# Patient Record
Sex: FEMALE | Race: WHITE | NOT HISPANIC OR LATINO | Employment: FULL TIME | URBAN - METROPOLITAN AREA
[De-identification: names, ages, dates, MRNs, and addresses within clinical notes are randomized per-mention and may not be internally consistent; named-entity substitution may affect disease eponyms.]

---

## 2020-11-08 ENCOUNTER — HOSPITAL ENCOUNTER (EMERGENCY)
Facility: CLINIC | Age: 50
Discharge: HOME | End: 2020-11-08
Attending: EMERGENCY MEDICINE | Admitting: EMERGENCY MEDICINE
Payer: COMMERCIAL

## 2020-11-08 VITALS
DIASTOLIC BLOOD PRESSURE: 88 MMHG | SYSTOLIC BLOOD PRESSURE: 117 MMHG | RESPIRATION RATE: 178 BRPM | WEIGHT: 122 LBS | TEMPERATURE: 96.6 F | HEART RATE: 77 BPM | OXYGEN SATURATION: 97 %

## 2020-11-08 DIAGNOSIS — Z77.098 CHEMICAL EXPOSURE OF EYE: ICD-10-CM

## 2020-11-08 PROCEDURE — 99284 EMERGENCY DEPT VISIT MOD MDM: CPT | Performed by: EMERGENCY MEDICINE

## 2020-11-08 PROCEDURE — 99283 EMERGENCY DEPT VISIT LOW MDM: CPT | Performed by: EMERGENCY MEDICINE

## 2020-11-08 PROCEDURE — 250N000013 HC RX MED GY IP 250 OP 250 PS 637: Performed by: EMERGENCY MEDICINE

## 2020-11-08 PROCEDURE — 250N000009 HC RX 250: Performed by: EMERGENCY MEDICINE

## 2020-11-08 RX ORDER — ERYTHROMYCIN 5 MG/G
OINTMENT OPHTHALMIC ONCE
Status: DISCONTINUED | OUTPATIENT
Start: 2020-11-08 | End: 2020-11-08

## 2020-11-08 RX ORDER — OXYCODONE AND ACETAMINOPHEN 5; 325 MG/1; MG/1
1-2 TABLET ORAL EVERY 4 HOURS PRN
Qty: 6 TABLET | Refills: 0 | Status: SHIPPED | OUTPATIENT
Start: 2020-11-08

## 2020-11-08 RX ORDER — PROPARACAINE HYDROCHLORIDE 5 MG/ML
1 SOLUTION/ DROPS OPHTHALMIC ONCE
Status: DISCONTINUED | OUTPATIENT
Start: 2020-11-08 | End: 2020-11-08

## 2020-11-08 RX ORDER — TETRACAINE HYDROCHLORIDE 5 MG/ML
1-2 SOLUTION OPHTHALMIC ONCE
Status: COMPLETED | OUTPATIENT
Start: 2020-11-08 | End: 2020-11-08

## 2020-11-08 RX ORDER — ERYTHROMYCIN 5 MG/G
OINTMENT OPHTHALMIC ONCE
Status: COMPLETED | OUTPATIENT
Start: 2020-11-08 | End: 2020-11-08

## 2020-11-08 RX ORDER — OXYCODONE AND ACETAMINOPHEN 5; 325 MG/1; MG/1
1 TABLET ORAL ONCE
Status: COMPLETED | OUTPATIENT
Start: 2020-11-08 | End: 2020-11-08

## 2020-11-08 RX ADMIN — ERYTHROMYCIN: 5 OINTMENT OPHTHALMIC at 16:32

## 2020-11-08 RX ADMIN — OXYCODONE HYDROCHLORIDE AND ACETAMINOPHEN 1 TABLET: 5; 325 TABLET ORAL at 16:32

## 2020-11-08 RX ADMIN — TETRACAINE HYDROCHLORIDE 2 DROP: 5 SOLUTION OPHTHALMIC at 13:41

## 2020-11-08 NOTE — ED AVS SNAPSHOT
[facility] Emergency Dept  [address] [address]  Phone: [phone]  Fax: [phone]                                    Violet Schneider   MRN: [id number]    Department: [facility] Emergency Dept   Date of Visit: 11/8/2020           After Visit Summary Signature Page    I have received my discharge instructions, and my questions have been answered. I have discussed any challenges I see with this plan with the nurse or doctor.    ..........................................................................................................................................  Patient/Patient Representative Signature      ..........................................................................................................................................  Patient Representative Print Name and Relationship to Patient    ..................................................               ................................................  Date                                   Time    ..........................................................................................................................................  Reviewed by Signature/Title    ...................................................              ..............................................  Date                                               Time          22EPIC Rev 08/18

## 2020-11-08 NOTE — LETTER
November 8, 2020          To Whom It May Concern:          Violet Schneider was seen in our Emergency Department today, 11/08/20 for non-covid related issues.  I expect her condition to improve over the next 5 days.  She may return to work/school when improved.          Sincerely,        GRICELDA Hester RN

## 2020-11-08 NOTE — ED TRIAGE NOTES
Spoke with Lisa at poison control she stated Bioderma micellaire is only an irrinatant, no acid/base in  Solution. Just recommend irrigation and then checking for abrasion

## 2020-11-08 NOTE — ED PROVIDER NOTES
History     Chief Complaint   Patient presents with     Eye Pain     bilateral eye kendy after putting in contacts today, poss put wrong solution in contacts     HPI  Violet Schneider is a 50 year old female with past medical history significant for asthma who presents the emergency department planing of bilateral eye pain.  Patient around 1030 this morning was putting in her contacts and thinks she accidentally put her contacts in bioderma micellaire instead of her solution.  She immediately had severe pain in bilateral eyes and has had significant irritation since that time.  Her eyes are watering and she is having a bit of cloudy vision.  She denies any other illness or problems.  She denies any other significant problems.  She denies a headache she has not had any neck pain.  She is not short of breath.  She does not have a rash.  What is wrong can I help you    Allergies:  Allergies   Allergen Reactions     Morphine Sulfate        Problem List:    There are no active problems to display for this patient.       Past Medical History:    No past medical history on file.    Past Surgical History:    No past surgical history on file.    Family History:    No family history on file.    Social History:  Marital Status:   [2]  Social History     Tobacco Use     Smoking status: Never Smoker     Smokeless tobacco: Never Used   Substance Use Topics     Alcohol use: Yes     Comment: rare     Drug use: Not on file        Medications:         Montelukast Sodium (SINGULAIR PO)       ORDER FOR DME       traMADol (ULTRAM) 50 MG tablet          Review of Systems  As per HPI.  Physical Exam   BP: 117/88  Pulse: 77  Temp: 96.6  F (35.9  C)  Resp: (!) 178  Weight: 55.3 kg (122 lb)  SpO2: 97 %      Physical Exam  Vitals signs and nursing note reviewed.   Constitutional:       Appearance: Normal appearance. She is not ill-appearing, toxic-appearing or diaphoretic.      Comments: In distress secondary to eye pain   HENT:       Head: Normocephalic.      Nose: Nose normal.   Eyes:      General: Lids are normal. Lids are everted, no foreign bodies appreciated. Vision grossly intact. Gaze aligned appropriately.         Right eye: No foreign body or discharge.         Left eye: No foreign body or discharge.      Extraocular Movements: Extraocular movements intact.      Conjunctiva/sclera:      Right eye: Right conjunctiva is not injected.      Left eye: Left conjunctiva is not injected.      Pupils: Pupils are equal, round, and reactive to light.      Funduscopic exam:     Right eye: Red reflex present.         Left eye: Red reflex present.     Slit lamp exam:     Right eye: Anterior chamber quiet. No foreign body.      Left eye: Anterior chamber quiet. No foreign body.      Comments: Conjunctiva injected bilaterally.  There is mild superficial abrasions/punctate lesions on bilateral corneas.  No significant clouding uptake with fluorescein present.  No foreign bodies are noted    Neck:      Musculoskeletal: Normal range of motion.   Pulmonary:      Effort: Pulmonary effort is normal.   Musculoskeletal: Normal range of motion.      Right lower leg: No edema.   Skin:     General: Skin is warm and dry.   Neurological:      Mental Status: She is alert and oriented to person, place, and time.   Psychiatric:         Mood and Affect: Mood normal.         ED Course        Procedures               Critical Care time:  none               No results found for this or any previous visit (from the past 24 hour(s)).    Medications   tetracaine (PONTOCAINE) 0.5 % ophthalmic solution 1-2 drop (2 drops Both Eyes Given by Other 11/8/20 2193)       Assessments & Plan (with Medical Decision Making) records were reviewed Poison control was called and it was noted that bioderma Micellaire was a neutral solution without acid or base in it and that it is an irrigant and recommended irrigation and then checking for abrasion.  Proparacaine drops relieved patient's  pain.  There is evidence of corneal abrasions/irritation on slit-lamp exam.  No foreign body is noted.  Eyes were irrigated twice with normal saline pH remained 7.0 on recheck patient continued to have pain and was given a dose of Percocet.  Erythromycin ointment was placed bilaterally.  Patient was able to read fine print without difficulty after proparacaine drops.  Am going to have patient continue erythromycin ointment.  I have [facility] for [facility] assessment.  Patient is to return if pain worsens any vision changes or other changes occur.  She feels comfortable with this plan at this time.     I have reviewed the nursing notes.    I have reviewed the findings, diagnosis, plan and need for follow up with the patient.       Discharge Medication List as of 11/8/2020  5:27 PM      START taking these medications    Details   oxyCODONE-acetaminophen (PERCOCET) 5-325 MG tablet Take 1-2 tablets by mouth every 4 hours as needed for severe pain, Disp-6 tablet, R-0, Local Print             Final diagnoses:   Chemical exposure of eye       11/8/2020   [facility] EMERGENCY DEPT     Jarrett Ryan MD  11/10/20 4828

## 2020-11-08 NOTE — DISCHARGE INSTRUCTIONS
Return if increasing pain vision changes or other symptoms.  The chemical you and in your eyes was an irritant but did not have acid or basic qualities in it.  Continue to use the erythromycin ointment 4 times a day.  Take Percocet for pain and follow-up with ophthalmology tomorrow.  If severe pain or no improvement of symptoms please return to the ED for recheck if unable to see an eye physician.

## 2020-11-10 ASSESSMENT — VISUAL ACUITY: OU: 1

## 2023-09-09 ENCOUNTER — OFFICE VISIT (OUTPATIENT)
Dept: URGENT CARE | Facility: URGENT CARE | Age: 53
End: 2023-09-09
Payer: COMMERCIAL

## 2023-09-09 VITALS
DIASTOLIC BLOOD PRESSURE: 76 MMHG | SYSTOLIC BLOOD PRESSURE: 127 MMHG | WEIGHT: 126 LBS | OXYGEN SATURATION: 97 % | TEMPERATURE: 97.2 F | HEART RATE: 63 BPM

## 2023-09-09 DIAGNOSIS — J30.9 ALLERGIC SINUSITIS: Primary | ICD-10-CM

## 2023-09-09 PROBLEM — E78.2 MIXED HYPERLIPIDEMIA: Status: ACTIVE | Noted: 2022-01-17

## 2023-09-09 PROBLEM — K58.0 IRRITABLE BOWEL SYNDROME WITH DIARRHEA: Status: ACTIVE | Noted: 2023-02-22

## 2023-09-09 PROBLEM — I10 ESSENTIAL HYPERTENSION: Status: ACTIVE | Noted: 2022-01-17

## 2023-09-09 PROCEDURE — 99203 OFFICE O/P NEW LOW 30 MIN: CPT

## 2023-09-09 RX ORDER — MONTELUKAST SODIUM 10 MG/1
TABLET ORAL
COMMUNITY
Start: 2023-08-14

## 2023-09-09 RX ORDER — PREDNISONE 20 MG/1
20 TABLET ORAL 2 TIMES DAILY
Qty: 10 TABLET | Refills: 0 | Status: SHIPPED | OUTPATIENT
Start: 2023-09-09 | End: 2023-09-14

## 2023-09-09 RX ORDER — LISINOPRIL 10 MG/1
10 TABLET ORAL DAILY
COMMUNITY
Start: 2023-02-22

## 2023-09-09 RX ORDER — BENZONATATE 200 MG/1
200 CAPSULE ORAL 3 TIMES DAILY PRN
Qty: 30 CAPSULE | Refills: 0 | Status: SHIPPED | OUTPATIENT
Start: 2023-09-09

## 2023-09-09 RX ORDER — ALBUTEROL SULFATE 90 UG/1
AEROSOL, METERED RESPIRATORY (INHALATION)
COMMUNITY
Start: 2023-02-22

## 2023-09-09 RX ORDER — CETIRIZINE HYDROCHLORIDE 10 MG/1
10 TABLET ORAL DAILY
COMMUNITY

## 2023-09-09 RX ORDER — BETAMETHASONE DIPROPIONATE 0.05 %
OINTMENT (GRAM) TOPICAL
COMMUNITY
Start: 2023-01-11

## 2023-09-09 NOTE — PROGRESS NOTES
URGENT CARE  Assessment & Plan   Assessment:   Violet Schneider is a 53 year old female who's clinical presentation today is consistent with:   1. Allergic sinusitis  - predniSONE (DELTASONE) 20 MG tablet;   - benzonatate (TESSALON) 200 MG capsule;   Plan:  Will treat patient with supportive and symptomatic measures for sinusitis at this time which include: Fluids, rest, over-the-counter medications: decongestants, antihistamines,  and expectorants, side effects of medications reviewed. We will also try steroids to help relieve the sinus pressure and pain along with an antitussive given her most irritating symptom is her cough. Suspect post nasal drip irritative cough, superimposed on allergic sinusitis   Additionally we discussed if symptoms do not improve after starting today's treatment (or if symptoms worsen) to follow up in 5-7 days.  No alarm signs or symptoms present   Differential Diagnoses for this patient's chief complaint that I considered include:  Polyps, turbinate hypertrophy, Chronic sinusitis, adenoiditis, allergic rhinitis, ABRS, viral, or fungal etiology, Tumors/malignancy, foreign body      Patient is agreeable to treatment plan and state they will follow-up if symptoms do not improve and/or if symptoms worsen   see patient's AVS 'monitor for' section for specific patient instructions given and discussed regarding what to watch for and when to follow up    CYRUS Sanchez [facility] [place]      ______________________________________________________________________      Subjective     HPI: Violet Schneider  is a 53 year old  female who presents today for evaluation the following concerns:   Patient  endorses sinus issues today which started about a month ago    Patient reports she has allergies and they are not getting better, she reports a cough with yellow mucus, headache and wheezing at night    Patient denies any fevers} sweats, chills, myalgias, wheezing,  shortness of breath, difficulty breathing, chest pain, weakness or signs of dehydration   additionally patient endorses a history of asthma but denies any asthma flare issues today, states she tried her albuterol and it wasn't helpful   Patient endorses her most bothersome symptom is her  cough .      Review of Systems:  Pertinent review of systems as reflected in HPI, otherwise negative.     Objective    Physical Exam:  Vitals:    09/09/23 0957   BP: 127/76   Pulse: 63   Temp: 97.2  F (36.2  C)   TempSrc: Tympanic   SpO2: 97%   Weight: 57.2 kg (126 lb)      General: Alert and oriented, no acute distress, Vital signs reviewed: afebrile,  normotensive   Psy/mental status: Cooperative, nonanxious  SKIN: Intact, no rashes  EYES: EOMs intact, PERRLA bilaterally   Conjunctiva: Clear bilaterally, no injection or erythema present  EARS: TMs intact, translucent gray in color with normal landmarks present no erythema  or bulging tympanic membrane   Canals are without swelling, however have a mild amount of cerumen, no impaction  NOSE:  mucosa  erythematous bilaterally with a mild amount of rhinorrhea, clear  discharge}               No frontal or maxillary sinus tenderness present bilaterally  MOUTH/THROAT: lips, tongue, & oral mucosa appear normal upon inspection                Posterior oropharynx is erythematous but without exudate, lesions or tonsillar  Edema, no dysphonia, no unilateral tonsillar edema, no uvular deviation,   no signs of peritonsillar abscess  NECK: supple, has full range of motion with no meningeal signs              No lymphadenopathy present  LUNG: normal work of breathing, good respiratory effort without retractions, good air  movement, non labored, inspection reveals normal chest expansion w/  inspiration            Lung sounds are clear to auscultation bilaterally,            No rales/rhonic/crackles wheezing noted           No cough noted        ______________________________________________________________________    I explained my diagnostic considerations and recommendations to the patient, who voiced understanding and agreement with the treatment plan.   All questions were answered.   We discussed potential side effects, risks and benefits of any prescribed or recommended therapies, as well as expectations for response to treatments.  Please see AVS for any patient instructions & handouts given.   Patient was advised to contact the Nurse Care Line, their Primary Care provider, Urgent Care, or the Emergency Department if there are new or worsening symptoms, or call 911 for emergencies.

## 2023-09-09 NOTE — PATIENT INSTRUCTIONS
Diagnosis:   Sinusitis- sinus pain (non bacterial cause) Viral or allergic sinusitis   The majority of sinusitis is not caused by bacteria and thus antibiotics are not indicated.   Green/yellow or thick mucus does not mean you have a bacterial sinus infection   Antibiotics can have anti-inflammatory effects and thus may decrease symptoms slightly but the side effects outweigh this risk.   For bacterial sinus infections, Azithromycin (Z-Pack) is not a first line nor a second line medication for this in part because of bacterial resistance caused in part by over-prescribing    Plan:   decongestants and antihistamines   Pseudoephedrine/ sudafed - behind the counter pharmacy - take while congested   Cetazine / loratadine   take daily   Benadryl - help w/ sleeping and breathing while sleeping   Nasal Sprays: When using a nasal spray insert the application into the nostril while  looking downward to the floor.   Remember NOSE toward TOES. This will help ensure the medication stays  where it is supposed to be and it will work better.   Nasal Saline may be used to help alleviate symptoms  Fluticasone (e.g. Flonase, Nasacort, etc) nasal spray is a nasal steroid that works to decrease inflammation, swelling, pain, and drainage.   Use this after using nasal saline. Use 2 sprays per nostril ONE time a day for SEVEN days.   Then decrease to 1 spray per nostril ONE time a day as needed.  Oxymetazoline (Afrin): May use ONE spray per nostril 2 times a day as needed.   DO NOT USE FOR MORE THAN 3 DAYS. Doing so will result in worsening symptoms.   Wait at least 15 minutes between Afrin and Flonase.   Use nasal rinses  Neti pots - highly effective   Ensure humidity in your living space -   Especially during the winter months when it is notably more dry.   This can be done with a humidifier.   OTC pain relievers for headache and sinus pressure   Acetaminophen (Tylenol) -or/and- Ibuprofen (advil, motrin)   Stay Hydrated - drink fluids  throughout the day.        Monitor for:   Stiff neck  Abnormal lethargy or confusion  Swelling of your eyelids  Vision problems, such as blurred or double vision  Fever of 101  F or higherSymptoms that don't go away in 10 days  Seizure  Trouble breathing  Feeling dizzy or faint  Fingernails, skin, or lips look blue, purple, or gray            Understanding viral  sinusitis  Viral sinusitis is when the lining of the inside of the nose and the sinuses becomes irritated and swollen. It is also called sinusitis, or a sinus infection.  Sinuses are air-filled spaces in the skull behind the face. They are kept moist and clean by a lining of mucosa. Things such as pollen, smoke, and chemical fumes can irritate the mucosa. It can then swell up. As a response to irritation, the mucosa makes more mucus and other fluids. Tiny hairlike cilia cover the mucosa. Cilia help carry mucus toward the opening of the sinus. Too much mucus may cause the cilia to stop working. This blocks the sinus opening. A buildup of fluid in the sinuses then causes pain and pressure. It can also cause bacteria to grow in the sinuses.    Symptoms   Symptoms often last around 7 to 10 days.   They may also get better but then worsen. You may have:  Face pain or pressure under the eyes and around the nose  Headache  Fluid draining in the back of the throat (postnasal drip)  Congestion  Drainage that is thick and colored (often green), instead of clear  Cough  Problems with your sense of smell  Ear pain or hearing problems  Fever  Tooth pain  Fatigue    Diagnosing   Your healthcare provider will ask about your symptoms and past health.   He or she will look at your ears, nose, throat, and sinuses.   Often labs are needed to determine if the sinusitis is caused by a virus or bacterium     Treating acute rhinosinusitis  Most sinus infections will go away within 10 days. Your body will fight off the virus.  The bacterial kind need antiboitics but these are rare

## 2023-09-30 ENCOUNTER — HEALTH MAINTENANCE LETTER (OUTPATIENT)
Age: 53
End: 2023-09-30

## 2024-11-23 ENCOUNTER — HEALTH MAINTENANCE LETTER (OUTPATIENT)
Age: 54
End: 2024-11-23

## 2025-05-11 ENCOUNTER — HEALTH MAINTENANCE LETTER (OUTPATIENT)
Age: 55
End: 2025-05-11